# Patient Record
Sex: MALE | Race: WHITE | NOT HISPANIC OR LATINO | URBAN - NONMETROPOLITAN AREA
[De-identification: names, ages, dates, MRNs, and addresses within clinical notes are randomized per-mention and may not be internally consistent; named-entity substitution may affect disease eponyms.]

---

## 2023-01-20 ENCOUNTER — OFFICE VISIT (OUTPATIENT)
Dept: URBAN - NONMETROPOLITAN AREA CLINIC 2 | Facility: CLINIC | Age: 63
End: 2023-01-20

## 2023-08-18 ENCOUNTER — DASHBOARD ENCOUNTERS (OUTPATIENT)
Age: 63
End: 2023-08-18

## 2023-08-18 ENCOUNTER — OFFICE VISIT (OUTPATIENT)
Dept: URBAN - NONMETROPOLITAN AREA CLINIC 2 | Facility: CLINIC | Age: 63
End: 2023-08-18
Payer: COMMERCIAL

## 2023-08-18 VITALS
HEIGHT: 74 IN | DIASTOLIC BLOOD PRESSURE: 64 MMHG | SYSTOLIC BLOOD PRESSURE: 120 MMHG | BODY MASS INDEX: 21.69 KG/M2 | WEIGHT: 169 LBS | HEART RATE: 82 BPM

## 2023-08-18 DIAGNOSIS — Z12.11 SCREENING FOR MALIGNANT NEOPLASM OF COLON: ICD-10-CM

## 2023-08-18 DIAGNOSIS — G10 HUNTINGTON DISEASE: ICD-10-CM

## 2023-08-18 PROBLEM — 58756001: Status: ACTIVE | Noted: 2023-08-18

## 2023-08-18 PROCEDURE — 99203 OFFICE O/P NEW LOW 30 MIN: CPT | Performed by: INTERNAL MEDICINE

## 2023-08-18 RX ORDER — ESZOPICLONE 3 MG/1
TABLET, FILM COATED OROPHARYNGEAL
Qty: 90 TABLET | Status: ACTIVE | COMMUNITY

## 2023-08-18 RX ORDER — VITAMIN E MIXED 400 UNIT
1 TABLET CAPSULE ORAL ONCE A DAY
Status: ACTIVE | COMMUNITY

## 2023-08-18 RX ORDER — FAMOTIDINE 40 MG/1
1 TABLET AT BEDTIME TABLET, FILM COATED ORAL ONCE A DAY
Status: ACTIVE | COMMUNITY

## 2023-08-18 NOTE — HPI-TODAY'S VISIT:
Mr. Felice English is a pleasant 62 y/o M who presents to schedule colon cancer screening.  He had a normal colonoscopy with Dr. Menjivar in 2013.  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  She denies any GI symptoms.  He is not having any dysphagia.  He would like to proceed with screening colonoscopy before his [place]'s gets worse.  His mother had it.

## 2023-10-09 ENCOUNTER — OFFICE VISIT (OUTPATIENT)
Dept: URBAN - METROPOLITAN AREA MEDICAL CENTER 1 | Facility: MEDICAL CENTER | Age: 63
End: 2023-10-09

## 2025-03-26 ENCOUNTER — LAB OUTSIDE AN ENCOUNTER (OUTPATIENT)
Dept: URBAN - NONMETROPOLITAN AREA CLINIC 2 | Facility: CLINIC | Age: 65
End: 2025-03-26

## 2025-03-26 ENCOUNTER — OFFICE VISIT (OUTPATIENT)
Dept: URBAN - NONMETROPOLITAN AREA CLINIC 2 | Facility: CLINIC | Age: 65
End: 2025-03-26
Payer: COMMERCIAL

## 2025-03-26 DIAGNOSIS — G10 HUNTINGTON DISEASE: ICD-10-CM

## 2025-03-26 DIAGNOSIS — Z12.11 SCREENING FOR MALIGNANT NEOPLASM OF COLON: ICD-10-CM

## 2025-03-26 PROCEDURE — 99213 OFFICE O/P EST LOW 20 MIN: CPT | Performed by: NURSE PRACTITIONER

## 2025-03-26 RX ORDER — VITAMIN E MIXED 400 UNIT
1 TABLET CAPSULE ORAL ONCE A DAY
Status: ACTIVE | COMMUNITY

## 2025-03-26 RX ORDER — FAMOTIDINE 40 MG/1
1 TABLET AT BEDTIME TABLET, FILM COATED ORAL ONCE A DAY
Status: ACTIVE | COMMUNITY

## 2025-03-26 RX ORDER — ESZOPICLONE 3 MG/1
TABLET, FILM COATED OROPHARYNGEAL
Qty: 90 TABLET | Status: ACTIVE | COMMUNITY

## 2025-03-26 NOTE — HPI-OTHER HISTORIES
8/18/2023: Mr. Felice English is a pleasant 64 y/o M who presents to schedule colon cancer screening. He had a normal colonoscopy with Dr. Menjivar in 2013. 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 She denies any GI symptoms. He is not having any dysphagia. He would like to proceed with screening colonoscopy before his [place]'s gets worse. His mother had it.

## 2025-03-26 NOTE — HPI-TODAY'S VISIT:
Mr. Felice English presents today for colon cancer screening.  He was seen in 2023 for routine colon cancer screening but did not have this performed due to financial burden. He has a diagnosis of [place]'s disease and colonoscopy was requested to be perrformed at [facility].  They would now like to know if it could be performed at [facility] if less expensive.  Felice denies any changes to his bowel habits.  No problems with constipation or diarrhea.  No bleeding.  Able to ambulate on his own without difficulty despite [place]'s diagnosis.  LG.

## 2025-05-05 ENCOUNTER — TELEPHONE ENCOUNTER (OUTPATIENT)
Dept: URBAN - NONMETROPOLITAN AREA CLINIC 2 | Facility: CLINIC | Age: 65
End: 2025-05-05

## 2025-05-12 ENCOUNTER — OFFICE VISIT (OUTPATIENT)
Dept: URBAN - METROPOLITAN AREA MEDICAL CENTER 1 | Facility: MEDICAL CENTER | Age: 65
End: 2025-05-12